# Patient Record
(demographics unavailable — no encounter records)

---

## 2024-10-30 NOTE — PHYSICAL EXAM
[2+] : left 2+ [Ankle Swelling (On Exam)] : present [Varicose Veins Of Lower Extremities] : present [] : present [Ankle Swelling On The Left] : moderate [No Rash or Lesion] : No rash or lesion [de-identified] : NAD

## 2024-10-30 NOTE — PHYSICAL EXAM
[2+] : left 2+ [Ankle Swelling (On Exam)] : present [Varicose Veins Of Lower Extremities] : present [] : present [Ankle Swelling On The Left] : moderate [No Rash or Lesion] : No rash or lesion [de-identified] : NAD

## 2024-10-30 NOTE — HISTORY OF PRESENT ILLNESS
[FreeTextEntry1] : 54 y/o F  who presents for  bilateral leg pain for the past ___ months. Patient's leg discomfort is associated with leg swelling, fatigue, heaviness, achiness, restlessness, muscle cramping, itchiness, dry, flaky skin, and skin darkening at the ankles. Patient complains of  painful varicose veins. Patient's symptoms have persisted despite conservative management with elevation, over-the-counter medications (ibuprofen), and compression stocking use for more than 3 months. Patient's symptoms are aggravated by weight bearing, prolonged standing, prolonged sitting, extended travel. Patient's symptoms are alleviated by wearing compression stockings, leg elevation, exercise. . Patient's symptoms interfere with the patient's ADLs such as work, shopping and housekeeping.    Patient  stands for prolonged periods of time with the inability to take frequent breaks to elevate their legs.   Previous treatment, vein procedures and vein surgeries include wearing compression stockings for more than 3 months with little or no relief.

## 2024-10-30 NOTE — ASSESSMENT
[FreeTextEntry1] : 54 y/o F with persistent and worsening bilateral lower extremity venous insufficiency, CEAP classification C  3 which is unresponsive to at least 3 months of compression stockings 20-30 mmHg, leg elevation, exercise and over the counter pain medication_(Ibuprofen).  Patient has complaints of worsening leg discomfort with swelling, fatigue, heaviness, achiness, cramping, restlessness, and painful varicosities.  The patients symptoms interfere with their ADLs, such as walking, shopping and house work, and their ability to work and exercise. Patient has intact pulses in both legs without evidence of arterial insufficiency.    Treatment is indicated not for cosmetic reasons but for symptomatic venous reflux disease with symptoms which is refractory to conservative therapy. Venous duplex study demonstrates bilateral  lower extremity venous insufficiency. The need for definitive effective treatment is based on severe, interfering and worsening reflux symptoms with evidence of venous insufficiency on venous ultrasound.   Patient is a candidate for endovenous ablation treatment of the  bilateral thigh GSVs and Varithena of bl thigh GSVs    The risks and benefits of endovenous ablation treatment versus continued conservative management were discussed with the patient.  Patient chooses endovenous ablation treatments. Treatment plan to be scheduled.

## 2024-10-30 NOTE — CONSULT LETTER
[Dear  ___] : Dear  [unfilled], [Consult Letter:] : I had the pleasure of evaluating your patient, [unfilled]. [Please see my note below.] : Please see my note below. [Consult Closing:] : Thank you very much for allowing me to participate in the care of this patient.  If you have any questions, please do not hesitate to contact me. [Sincerely,] : Sincerely, [FreeTextEntry3] : Claudine Ruggiero MD, FSVS, FACS Associate Chief, Vascular & Endovascular Surgery , Vascular Surgery Fellowship & Residency  Associate Professor of Surgery, [facility] [employer] at \[facility]\""/[facility]  Division of Vascular & Endovascular Surgery [facility] [address], [address] [place] [id number] Tel: [phone]

## 2024-10-30 NOTE — HISTORY OF PRESENT ILLNESS
[FreeTextEntry1] : 56 y/o F  who presents for  bilateral leg pain for the past ___ months. Patient's leg discomfort is associated with leg swelling, fatigue, heaviness, achiness, restlessness, muscle cramping, itchiness, dry, flaky skin, and skin darkening at the ankles. Patient complains of  painful varicose veins. Patient's symptoms have persisted despite conservative management with elevation, over-the-counter medications (ibuprofen), and compression stocking use for more than 3 months. Patient's symptoms are aggravated by weight bearing, prolonged standing, prolonged sitting, extended travel. Patient's symptoms are alleviated by wearing compression stockings, leg elevation, exercise. . Patient's symptoms interfere with the patient's ADLs such as work, shopping and housekeeping.    Patient  stands for prolonged periods of time with the inability to take frequent breaks to elevate their legs.   Previous treatment, vein procedures and vein surgeries include wearing compression stockings for more than 3 months with little or no relief.

## 2024-10-30 NOTE — CONSULT LETTER
[Dear  ___] : Dear  [unfilled], [Consult Letter:] : I had the pleasure of evaluating your patient, [unfilled]. [Please see my note below.] : Please see my note below. [Consult Closing:] : Thank you very much for allowing me to participate in the care of this patient.  If you have any questions, please do not hesitate to contact me. [Sincerely,] : Sincerely, [FreeTextEntry3] : Claudine Ruggiero MD, FSVS, FACS Associate Chief, Vascular & Endovascular Surgery , Vascular Surgery Fellowship & Residency  Associate Professor of Surgery, [facility] [employer] at [facility]  Division of Vascular & Endovascular Surgery [facility] [address], [address] [place] [id number] Tel: [phone]

## 2024-11-11 NOTE — PROCEDURE
[FreeTextEntry1] : right GSV RFA [FreeTextEntry3] : Procedural safety checklist and time out completed: Confirmed patient identification (Patient Name, , and/or medical record number including, when possible, affirmation by patient or parent/family/other). Confirmed procedure with the patient. Consent present, accurate and signed. Confirmed special equipment and supplies are present. Sterility confirmed. Position verified. Site/ side is marked and visible and confirmed. Procedure confirmed by consent. Accurate consent including side and site. Review of medical records, including venous ultrasound, noting correct procedure including site and side. MD/PA verifies presence and review of imaging studies and or written report of imaging studies. Agreement on the procedure to be performed. Time out completed. All of the above has been confirmed by the team. All patient-specific concerns have been addressed.   Indication: right lower extremity varicose veins with inflammation, leg pain, leg swelling, and leg cramping.  Venous insufficiency/ reflux.   Procedure: radiofrequency ablation of the right great saphenous vein.   Ms. ROSALINO DE LA CRUZ is a 55 year old F with a history of right lower extremity varicose veins previously seen in the office.  Ultrasound examination demonstrated venous insufficiency. A trial of compression stockings, exercise, elevation, and pain medication was attempted without relief and definitive treatment with radiofrequency ablation was offered.   The patient has come for radiofrequency ablation treatment of the right great saphenous vein. I have discussed the risks of the procedure at length with the patient. The risks discussed were inclusive of but not limited to infection, irritation at the site of infiltration of local anesthesia, and also rare risk of deep venous thrombosis and pulmonary emboli. The patient agrees to proceed with the procedure. The patient was escorted into the procedure room and a time out called. The entire limb was prepped and draped in sterile fashion. The RF fiber was placed on the sterile field and connected by a sterile cable. Actuation, temperature, and impedance testing were performed to ensure that all components were connected and operating properly. The patient was placed on the procedure table and local anesthesia was instilled in the skin overlying the access site. Under ultrasound guidance, the vein was punctured with a micropuncture needle, using the anterior wall technique. A guide wire was now introduced through the needle, and the needle was then exchanged over the guide wire for a 7F sheath. The guide wire was removed, and the RF probe was then placed into the saphenous vein through the sheath and position confirmed using ultrasound guidance. After the RF probe position was verified by ultrasound, tumescent anesthesia consisting of normal saline and1% lidocaine was infiltrated, under ultrasound guidance, precisely into the perivenous compartment along the entire length of the vein until a halo of fluid was noted around the vein. After RF probe position was again confirmed with ultrasound imaging, RF energy was applied. The probe was gradually and carefully withdrawn at a rate of 6.5cm/20seconds.   7 cycles of RF performed using the 8 cm probe. Total treatment time [id number] seconds. The total volume injected was 450 cc. Treatment length was 45 cm and The probe is 3.6 cm from the SFJ.   Estimated Blood Loss: minimal. Repeat ultrasound of the treated vein was performed confirming successful treatment. The catheter and sheath were withdrawn, and hemostasis established with direct pressure. After assuring hemostasis, a sterile 4x4 was placed on the access site and an ACE compression wrap was applied. Patient tolerated procedure well. Patient was given post-procedure instructions and follow up appointment was scheduled.

## 2024-11-25 NOTE — PROCEDURE
[FreeTextEntry1] : left GSV RFA   [FreeTextEntry3] : Procedural safety checklist and time out completed: Confirmed patient identification (Patient Name, , and/or medical record number including, when possible, affirmation by patient or parent/family/other). Confirmed procedure with the patient. Consent present, accurate and signed. Confirmed special equipment and supplies are present. Sterility confirmed. Position verified. Site/ side is marked and visible and confirmed. Procedure confirmed by consent. Accurate consent including side and site. Review of medical records, including venous ultrasound, noting correct procedure including site and side. MD/PA verifies presence and review of imaging studies and or written report of imaging studies. Agreement on the procedure to be performed. Time out completed. All of the above has been confirmed by the team. All patient-specific concerns have been addressed.   Indication: left lower extremity varicose veins with inflammation, leg pain, leg swelling, and leg cramping.  Venous insufficiency/ reflux.   Procedure: radiofrequency ablation of the left great saphenous vein.   Ms. ROSALINO DE LA CRUZ is a 55 year old F with a history of left lower extremity varicose veins previously seen in the office.  Ultrasound examination demonstrated venous insufficiency. A trial of compression stockings, exercise, elevation, and pain medication was attempted without relief and definitive treatment with radiofrequency ablation was offered.   The patient has come for radiofrequency ablation treatment of the left great saphenous vein. I have discussed the risks of the procedure at length with the patient. The risks discussed were inclusive of but not limited to infection, irritation at the site of infiltration of local anesthesia, and also rare risk of deep venous thrombosis and pulmonary emboli. The patient agrees to proceed with the procedure. The patient was escorted into the procedure room and a time out called. The entire limb was prepped and draped in sterile fashion. The RF fiber was placed on the sterile field and connected by a sterile cable. Actuation, temperature, and impedance testing were performed to ensure that all components were connected and operating properly. The patient was placed on the procedure table and local anesthesia was instilled in the skin overlying the access site. Under ultrasound guidance, the vein was punctured with a micropuncture needle, using the anterior wall technique. A guide wire was now introduced through the needle, and the needle was then exchanged over the guide wire for a 7F sheath. The guide wire was removed, and the RF probe was then placed into the saphenous vein through the sheath and position confirmed using ultrasound guidance. After the RF probe position was verified by ultrasound, tumescent anesthesia consisting of normal saline and1% lidocaine was infiltrated, under ultrasound guidance, precisely into the perivenous compartment along the entire length of the vein until a halo of fluid was noted around the vein. After RF probe position was again confirmed with ultrasound imaging, RF energy was applied. The probe was gradually and carefully withdrawn at a rate of 6.5cm/20seconds.   6 cycles of RF performed using the 8 cm probe. Total treatment time was 120 seconds. The total volume injected was 350 cc. Treatment length was 45 cm and The probe is 3.7 cm from the SFJ.   Estimated Blood Loss: minimal. Repeat ultrasound of the treated vein was performed confirming successful treatment. The catheter and sheath were withdrawn, and hemostasis established with direct pressure. After assuring hemostasis, a sterile 4x4 was placed on the access site and an ACE compression wrap was applied. Patient tolerated procedure well. Patient was given post-procedure instructions and follow up appointment was scheduled.

## 2024-12-18 NOTE — PHYSICAL EXAM
[1+] : left 1+ [2+] : left 2+ [Varicose Veins Of Lower Extremities] : bilaterally [] : bilaterally [Ankle Swelling On The Right] : mild [No Rash or Lesion] : No rash or lesion [Alert] : alert [Oriented to Person] : oriented to person [Oriented to Place] : oriented to place [Oriented to Time] : oriented to time [Calm] : calm [Ankle Swelling (On Exam)] : not present [de-identified] : NAD [de-identified] : NCAT [de-identified] : unlabored breathing [FreeTextEntry1] : bilateral lower extremities soft, warm and nontender intact skin no significant leg edema mild venous stasis changes no wounds

## 2024-12-18 NOTE — HISTORY OF PRESENT ILLNESS
[FreeTextEntry1] : Pt presents to the office to evaluate bilateral lower extremities. History of venous insufficiency. She is s/p right GSV RFA 11/11/2024 and left GSV RFA on 11/25/2024. She is scheduled for bilateral distal GSV varithena in 2/2025. On 11/17/24, she went to the ER c/o RLE pain. Venous doppler showed "acute superficial thrombus of the greater saphenous vein extending from above the knee to just before the saphenofemoral junction." She took eliquis in the hospital but didn't take Eliquis after she was discharged as was recommended. Follow up RLE venous ultrasound on 11/18/24 showed negative dvt  Her legs feel better overall. They feel less heavy and achy. Leg swelling improved. Reports mild pain on left medial thigh after left GSV RFA. The pain is getting better. She is compliant with leg elevation and compression stockings. Denies claudication, rest pain or wounds.

## 2024-12-18 NOTE — ASSESSMENT
[Arterial/Venous Disease] : arterial/venous disease [FreeTextEntry1] : Impression - venous insufficiency s/p alondra GSV RFA with improvement   Plan Leg elevation, calf muscle exercises, weight management, diet control, knee high 20-30 mm hg compression stockings pt has alondra distal GSV varithena scheduled in Feb 2025 Advised pt to keep vein procedure appointments

## 2025-05-14 NOTE — PHYSICAL EXAM
[1+] : left 1+ [2+] : left 2+ [Ankle Swelling (On Exam)] : not present [Varicose Veins Of Lower Extremities] : not present [] : bilaterally [Ankle Swelling On The Right] : mild [No Rash or Lesion] : No rash or lesion [Alert] : alert [Oriented to Person] : oriented to person [Oriented to Place] : oriented to place [Oriented to Time] : oriented to time [Calm] : calm [de-identified] : NAD [de-identified] : NCAT [de-identified] : unlabored breathing [FreeTextEntry1] : bilateral lower extremities soft, warm and nontender intact skin no significant leg edema mild venous stasis changes no wounds

## 2025-05-14 NOTE — DATA REVIEWED
[FreeTextEntry1] : 5/14/2025 bilateral lower extremity venous doppler RLE no dvt, svt or reflux LLE no dvt, svt or reflux

## 2025-05-14 NOTE — HISTORY OF PRESENT ILLNESS
[FreeTextEntry1] : 56 yo female presents to the office to evaluate bilateral lower extremities. History of venous insufficiency. She is s/p right GSV RFA 11/11/2024 and left GSV RFA on 11/25/2024. She continues to feel discomfort and soreness on bilateral medial thigh. Very little improvement in the discomfort since bilateral GSV ablation. Also she feels right calf soreness. Denies leg swelling. Denies recent injury, trauma, long car ride or flight. Compliant with leg elevation and compression stockings. Denies claudication, rest pain or wounds.

## 2025-05-14 NOTE — ASSESSMENT
[FreeTextEntry1] : Ms. ROSALINO DE LA CRUZ is a 55 year F with lymphedema secondary to chronic venous insufficiency extending proximally into truncal region. Patient has remained compliant with daily use of compression stockings 20-30 mm hg, elevation and exercise since 2/1/2025 yet symptoms persist and mild hyperpigmentation noted.        54 yo female with venous insufficiency s/p alondra GSV RFA. She is experiencing residual pain and soreness on both medial thighs and right calf after alondra GSV RFA   Plan Leg elevation, calf muscle exercises, weight management, diet control, knee high 20-30 mm hg compression stockings, warm compress Referral for Flexitouch lymphedema pump Return to office as needed  Leg measurements  right ankle 9.5 inches right calf 17.5 inches right thigh 28 inches left ankle 9 inches left calf 18 inches left thigh 28.5 inches [Arterial/Venous Disease] : arterial/venous disease [Other: _____] : [unfilled]

## 2025-05-25 NOTE — HISTORY OF PRESENT ILLNESS
[FreeTextEntry1] : npv rash [de-identified] : ROSALINO DE LA CRUZ is a 55 year old F here for evaluation of below  rash on face x 3 days, somewhat itchy did use new perfume starting a week ago  growths on neck, previously had them burned by prior dermatologist and requests the same  also previously used sodium sulfacetamide wash (unclear why) and would like a refill

## 2025-05-25 NOTE — HISTORY OF PRESENT ILLNESS
[FreeTextEntry1] : npv rash [de-identified] : ROSALINO DE LA CRUZ is a 55 year old F here for evaluation of below  rash on face x 3 days, somewhat itchy did use new perfume starting a week ago  growths on neck, previously had them burned by prior dermatologist and requests the same  also previously used sodium sulfacetamide wash (unclear why) and would like a refill

## 2025-05-25 NOTE — ASSESSMENT
[FreeTextEntry1] : # Contact dermatitis, new acute diagnosis  - Reviewed risks (as well as mitigation strategies for adverse drug events as applicable), benefits, and alternatives of therapy  START triamcinolone ointment BID x no longer than 1 week. SED including atrophy, dyspigmentation, telangiectasias, striae. Proper use reviewed including only using to affected area and avoidance of prolonged use. Encouraged to avoid perfume and stop fragranced products at this time  # Skin tags/DPNs encircling neck - Counseled about clinically benign lesions - Discussed cosmetic treatments may be considered. Discussed that treatment is associated with an out of pocket cost. - Patient declined treatment today  refilled sodium sulfacetamide wash per pt request  RTC PRN

## 2025-05-25 NOTE — PHYSICAL EXAM
[Alert] : alert [Oriented x 3] : ~L oriented x 3 [Well Nourished] : well nourished [Conjunctiva Non-injected] : conjunctiva non-injected [No Visual Lymphadenopathy] : no visual  lymphadenopathy [No Clubbing] : no clubbing [No Edema] : no edema [No Bromhidrosis] : no bromhidrosis [No Chromhidrosis] : no chromhidrosis [FreeTextEntry3] : pink papules scattered on b/l cheeks brown papules scattered on neck